# Patient Record
Sex: MALE | Race: BLACK OR AFRICAN AMERICAN | NOT HISPANIC OR LATINO | URBAN - METROPOLITAN AREA
[De-identification: names, ages, dates, MRNs, and addresses within clinical notes are randomized per-mention and may not be internally consistent; named-entity substitution may affect disease eponyms.]

---

## 2017-11-19 ENCOUNTER — EMERGENCY (EMERGENCY)
Facility: HOSPITAL | Age: 42
LOS: 1 days | Discharge: HOME | End: 2017-11-19
Admitting: EMERGENCY MEDICINE
Payer: SELF-PAY

## 2017-11-19 VITALS
HEART RATE: 79 BPM | SYSTOLIC BLOOD PRESSURE: 138 MMHG | DIASTOLIC BLOOD PRESSURE: 80 MMHG | TEMPERATURE: 98 F | RESPIRATION RATE: 18 BRPM | OXYGEN SATURATION: 98 %
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
## 2017-11-19 DIAGNOSIS — S80.11XA CONTUSION OF RIGHT LOWER LEG, INITIAL ENCOUNTER: ICD-10-CM

## 2017-11-19 DIAGNOSIS — Z91.013 ALLERGY TO SEAFOOD: ICD-10-CM
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
## 2017-11-19 DIAGNOSIS — S80.211A ABRASION, RIGHT KNEE, INITIAL ENCOUNTER: ICD-10-CM

## 2017-11-19 DIAGNOSIS — M79.604 PAIN IN RIGHT LEG: ICD-10-CM
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
## 2017-11-19 PROCEDURE — 99284 EMERGENCY DEPT VISIT MOD MDM: CPT | Mod: 25

## 2017-11-19 PROCEDURE — 73590 X-RAY EXAM OF LOWER LEG: CPT | Mod: 26,RT

## 2017-11-19 PROCEDURE — 73620 X-RAY EXAM OF FOOT: CPT

## 2017-11-19 PROCEDURE — 73620 X-RAY EXAM OF FOOT: CPT | Mod: 26,RT

## 2017-11-19 PROCEDURE — 73620 X-RAY EXAM OF FOOT: CPT | Mod: 26

## 2017-11-19 PROCEDURE — 73562 X-RAY EXAM OF KNEE 3: CPT | Mod: 26,RT

## 2017-11-19 PROCEDURE — 73590 X-RAY EXAM OF LOWER LEG: CPT | Mod: 26

## 2017-11-19 PROCEDURE — 73590 X-RAY EXAM OF LOWER LEG: CPT

## 2017-11-19 PROCEDURE — 73562 X-RAY EXAM OF KNEE 3: CPT

## 2017-11-19 PROCEDURE — 73562 X-RAY EXAM OF KNEE 3: CPT | Mod: 26

## 2017-11-19 RX ORDER — IBUPROFEN 200 MG
800 TABLET ORAL ONCE
Qty: 0 | Refills: 0 | Status: COMPLETED | OUTPATIENT
Start: 2017-11-19 | End: 2017-11-19

## 2017-11-19 RX ADMIN — Medication 800 MILLIGRAM(S): at 20:40

## 2017-11-19 NOTE — ED PROVIDER NOTE - DIAGNOSTIC INTERPRETATION
ER PA: Alia ARRIETA ankle INTERPRETATION:  no acute fracture; no soft tissue swelling noted; normal bony alignment.    ER PA: Alia ARRIETA tib/fib INTERPRETATION:  no acute fracture; no soft tissue swelling noted; normal bony alignment.    ER PA: Alia ARRIETA foot INTERPRETATION:  no acute fracture; no soft tissue swelling noted; normal bony alignment.

## 2017-11-19 NOTE — ED PROVIDER NOTE - PHYSICAL EXAMINATION
CONSTITUTIONAL: Well-appearing; well-nourished; in no apparent distress.   HEAD: Normocephalic; atraumatic.   NECK: Supple; non-tender;   CARDIOVASCULAR: Normal S1, S2; no murmurs, rubs, or gallops. Regular rate and rhythm.   RESPIRATORY: Breathing easily; breath sounds clear and equal bilaterally; no wheezes, rhonchi, or rales.  MSK: FROM at all extremities, normal tone   EXT: RLE- + abrasion and tenderness inferior to R knee, FROM at knee. Ankle nontender. +tenderness to R 1st metatarsal. FROM at ankle

## 2017-11-19 NOTE — ED PROVIDER NOTE - OBJECTIVE STATEMENT
43 yo M with no pmh c/o R leg pain after falling into a manhole while at work. Pt was working on the track when the metal broke and his L leg fell in the hole. R leg twisted. No head injury. Pt reporting pain to R knee and R foot when he walks. Denies ha, neck pain, hip or back pain.

## 2017-11-19 NOTE — ED PROVIDER NOTE - MEDICAL DECISION MAKING DETAILS
41 yo m with no pmh with R leg pain s/p trip and fall in a manhole. No head injury. +abrasion to R leg below ankle, FROM at knee and ankle. Xray 43 yo m with no pmh with R leg pain s/p trip and fall in a manhole. No head injury. +abrasion to R leg below ankle, FROM at knee and ankle. Xray neg for fracture. +contusion vs sprain.

## 2017-11-19 NOTE — ED ADULT NURSE NOTE - OBJECTIVE STATEMENT
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 pt reports manhole broke, left leg slipped into manhole and scraped thigh, right leg caught him from falling all the way in the manhole but he hit his right knee. now c/o pain to left thigh, right knee, and bottom of right foot. nad, no abrasion, no bleeding, no obvious deformity. pt able to stand and ambulate independently.